# Patient Record
Sex: FEMALE
[De-identification: names, ages, dates, MRNs, and addresses within clinical notes are randomized per-mention and may not be internally consistent; named-entity substitution may affect disease eponyms.]

---

## 2017-02-22 ENCOUNTER — APPOINTMENT (OUTPATIENT)
Dept: INTERNAL MEDICINE | Facility: CLINIC | Age: 32
End: 2017-02-22

## 2017-02-22 VITALS — BODY MASS INDEX: 35.56 KG/M2 | HEIGHT: 62 IN | WEIGHT: 193.25 LBS

## 2017-02-22 DIAGNOSIS — E66.9 OBESITY, UNSPECIFIED: ICD-10-CM

## 2017-02-22 DIAGNOSIS — E66.01 MORBID (SEVERE) OBESITY DUE TO EXCESS CALORIES: ICD-10-CM

## 2017-04-12 ENCOUNTER — APPOINTMENT (OUTPATIENT)
Dept: INTERNAL MEDICINE | Facility: CLINIC | Age: 32
End: 2017-04-12

## 2017-07-24 ENCOUNTER — APPOINTMENT (OUTPATIENT)
Dept: GASTROENTEROLOGY | Facility: CLINIC | Age: 32
End: 2017-07-24

## 2018-07-31 ENCOUNTER — APPOINTMENT (OUTPATIENT)
Dept: GASTROENTEROLOGY | Facility: CLINIC | Age: 33
End: 2018-07-31
Payer: COMMERCIAL

## 2018-07-31 VITALS
SYSTOLIC BLOOD PRESSURE: 119 MMHG | TEMPERATURE: 98.7 F | HEIGHT: 62 IN | RESPIRATION RATE: 14 BRPM | BODY MASS INDEX: 36.8 KG/M2 | DIASTOLIC BLOOD PRESSURE: 82 MMHG | OXYGEN SATURATION: 97 % | HEART RATE: 87 BPM | WEIGHT: 200 LBS

## 2018-07-31 DIAGNOSIS — K21.9 GASTRO-ESOPHAGEAL REFLUX DISEASE W/OUT ESOPHAGITIS: ICD-10-CM

## 2018-07-31 DIAGNOSIS — K59.00 CONSTIPATION, UNSPECIFIED: ICD-10-CM

## 2018-07-31 PROCEDURE — 83013 H PYLORI (C-13) BREATH: CPT

## 2018-07-31 PROCEDURE — 99214 OFFICE O/P EST MOD 30 MIN: CPT | Mod: 25

## 2018-07-31 RX ORDER — POLYETHYLENE GLYCOL 3350 17 G/17G
17 POWDER, FOR SOLUTION ORAL
Qty: 1 | Refills: 2 | Status: ACTIVE | COMMUNITY
Start: 2018-07-31 | End: 1900-01-01

## 2018-07-31 RX ORDER — OMEPRAZOLE 20 MG/1
20 CAPSULE, DELAYED RELEASE ORAL
Qty: 30 | Refills: 0 | Status: ACTIVE | COMMUNITY
Start: 2018-07-31 | End: 1900-01-01

## 2018-08-02 ENCOUNTER — RESULT REVIEW (OUTPATIENT)
Age: 33
End: 2018-08-02

## 2018-08-06 LAB — UREA BREATH TEST QL: NEGATIVE

## 2018-08-10 ENCOUNTER — MOBILE ON CALL (OUTPATIENT)
Age: 33
End: 2018-08-10

## 2018-08-13 ENCOUNTER — MOBILE ON CALL (OUTPATIENT)
Age: 33
End: 2018-08-13

## 2018-08-13 RX ORDER — OMEPRAZOLE 40 MG/1
40 CAPSULE, DELAYED RELEASE ORAL TWICE DAILY
Qty: 60 | Refills: 1 | Status: ACTIVE | COMMUNITY
Start: 2018-08-13 | End: 1900-01-01

## 2018-08-29 ENCOUNTER — APPOINTMENT (OUTPATIENT)
Dept: GASTROENTEROLOGY | Facility: CLINIC | Age: 33
End: 2018-08-29
Payer: COMMERCIAL

## 2018-08-29 PROCEDURE — 43235 EGD DIAGNOSTIC BRUSH WASH: CPT

## 2018-10-02 ENCOUNTER — APPOINTMENT (OUTPATIENT)
Dept: GASTROENTEROLOGY | Facility: HOSPITAL | Age: 33
End: 2018-10-02

## 2021-07-06 ENCOUNTER — APPOINTMENT (OUTPATIENT)
Age: 36
End: 2021-07-06

## 2021-08-25 ENCOUNTER — APPOINTMENT (OUTPATIENT)
Dept: ORTHOPEDIC SURGERY | Facility: CLINIC | Age: 36
End: 2021-08-25

## 2024-05-08 ENCOUNTER — APPOINTMENT (OUTPATIENT)
Dept: ORTHOPEDIC SURGERY | Facility: CLINIC | Age: 39
End: 2024-05-08
Payer: COMMERCIAL

## 2024-05-08 VITALS — BODY MASS INDEX: 40.48 KG/M2 | WEIGHT: 220 LBS | HEIGHT: 62 IN

## 2024-05-08 DIAGNOSIS — R60.9 EDEMA, UNSPECIFIED: ICD-10-CM

## 2024-05-08 PROCEDURE — 99203 OFFICE O/P NEW LOW 30 MIN: CPT

## 2024-05-08 PROCEDURE — 73610 X-RAY EXAM OF ANKLE: CPT | Mod: 50

## 2024-05-08 NOTE — ASSESSMENT
[FreeTextEntry1] : Discussed at length with patient exam imaging and symptoms consistent with extracellular fluid in the soft tissue and pointed out the pitting nature and recommendation to follow-up with her primary as compression hose have not significant improved and she may be requiring a diuretic.  Patient states she has seen a cardiologist and reportedly negative echo and negative Dopplers

## 2024-05-08 NOTE — HISTORY OF PRESENT ILLNESS
[de-identified] : Initial visit: Bilateral ankle  Reason: no injury - after long flight  Duration: 2/2024 Prior studies: x rays  Symptoms swelling / throbbing /  Aggravating: walking / standing  Alleviating: elevation/ icing/  compression socks  Pain level:6/10 Pain medication: none Medical Hx: Pre diabetic  / hormonal imbalance  Surgical Hx:  n/a Current Medication: Allergies: Penicillin

## 2024-05-08 NOTE — PHYSICAL EXAM
[de-identified] : Bilateral leg  Constitutional:  The patient is healthy-appearing and in no apparent distress.  Patient is overweight  Gait and Station:  The patient ambulates with a normal gait and no limp.   Cardiovascular System:  Ther capillary refill is less than 2 seconds.   Skin:  There are no skin abnormalities of ankle.  Ankles and Feet:  Inspection:  There is no erythema. There is no induration. There is no warmth. There is no deformity.   There is 2+ pitting edema from the mid leg to the dorsal foot  Bony Palpation:  There is no tenderness of the calcaneal tuberosity. There is no tenderness of the metatarsals. There is no tenderness of the tarsometatarsal joints There is no tenderness of the navicular tuberosity.  There is no tenderness of the dome of talus. There is no tenderness of the head of talus. There is no tenderness of the inferior tibiofibular joint.  Soft Tissue Palpation:  There is no tenderness of the tibialis posterior. There is no tenderness of the tibialis anterior.  There is no tenderness of the plantar fascia. There is no tenderness of the Achilles tendon. There is no tenderness of the extensor hallucis longus. There is no tenderness of the sinus tarsi.  There is no tenderness of the peroneus longus and brevis. There is no tenderness of the deltoid ligament.    There is no tenderness of the anterior talofibular ligament and the calcaneofibular ligament.   Active Range of Motion:  The range of motion at the ankle is full.   Stability:  The anterior drawer is negative.   Strength:  There is 5/5 ankle plantarflexion and dorsiflexion.  Neurological System:  There is normal sensation to light touch at the ankle and foot.   Psychiatric:  The patient demonstrates a normal mood and affect and is active and alert.  [de-identified] : X-ray bilateral ankle: There is no significant bony / soft tissue abnormality, arthritis, or fracture.